# Patient Record
Sex: MALE | Race: WHITE | NOT HISPANIC OR LATINO | Employment: UNEMPLOYED | URBAN - METROPOLITAN AREA
[De-identification: names, ages, dates, MRNs, and addresses within clinical notes are randomized per-mention and may not be internally consistent; named-entity substitution may affect disease eponyms.]

---

## 2022-12-31 ENCOUNTER — APPOINTMENT (EMERGENCY)
Dept: CT IMAGING | Facility: HOSPITAL | Age: 57
End: 2022-12-31

## 2022-12-31 ENCOUNTER — HOSPITAL ENCOUNTER (EMERGENCY)
Facility: HOSPITAL | Age: 57
Discharge: HOME | End: 2022-12-31
Attending: EMERGENCY MEDICINE

## 2022-12-31 VITALS
SYSTOLIC BLOOD PRESSURE: 96 MMHG | RESPIRATION RATE: 16 BRPM | BODY MASS INDEX: 21.66 KG/M2 | WEIGHT: 138 LBS | HEIGHT: 67 IN | DIASTOLIC BLOOD PRESSURE: 62 MMHG | HEART RATE: 54 BPM | OXYGEN SATURATION: 95 % | TEMPERATURE: 98.9 F

## 2022-12-31 DIAGNOSIS — S39.011A STRAIN OF ABDOMINAL WALL, INITIAL ENCOUNTER: ICD-10-CM

## 2022-12-31 DIAGNOSIS — R10.9 ABDOMINAL WALL PAIN: Primary | ICD-10-CM

## 2022-12-31 LAB
ALBUMIN SERPL BCP-MCNC: 3.9 G/DL (ref 3.5–5)
ALP SERPL-CCNC: 88 U/L (ref 46–116)
ALT SERPL W P-5'-P-CCNC: 32 U/L (ref 12–78)
ANION GAP SERPL CALCULATED.3IONS-SCNC: 9 MMOL/L (ref 4–13)
AST SERPL W P-5'-P-CCNC: 29 U/L (ref 5–45)
BASOPHILS # BLD AUTO: 0.04 THOUSANDS/ÂΜL (ref 0–0.1)
BASOPHILS NFR BLD AUTO: 1 % (ref 0–1)
BILIRUB SERPL-MCNC: 0.2 MG/DL (ref 0.2–1)
BUN SERPL-MCNC: 14 MG/DL (ref 5–25)
CALCIUM SERPL-MCNC: 8.6 MG/DL (ref 8.3–10.1)
CHLORIDE SERPL-SCNC: 105 MMOL/L (ref 96–108)
CO2 SERPL-SCNC: 25 MMOL/L (ref 21–32)
CREAT SERPL-MCNC: 0.78 MG/DL (ref 0.6–1.3)
EOSINOPHIL # BLD AUTO: 0.15 THOUSAND/ÂΜL (ref 0–0.61)
EOSINOPHIL NFR BLD AUTO: 3 % (ref 0–6)
ERYTHROCYTE [DISTWIDTH] IN BLOOD BY AUTOMATED COUNT: 14.9 % (ref 11.6–15.1)
GFR SERPL CREATININE-BSD FRML MDRD: 100 ML/MIN/1.73SQ M
GLUCOSE SERPL-MCNC: 121 MG/DL (ref 65–140)
HCT VFR BLD AUTO: 39 % (ref 36.5–49.3)
HGB BLD-MCNC: 13 G/DL (ref 12–17)
IMM GRANULOCYTES # BLD AUTO: 0.02 THOUSAND/UL (ref 0–0.2)
IMM GRANULOCYTES NFR BLD AUTO: 0 % (ref 0–2)
LIPASE SERPL-CCNC: 115 U/L (ref 73–393)
LYMPHOCYTES # BLD AUTO: 1.96 THOUSANDS/ÂΜL (ref 0.6–4.47)
LYMPHOCYTES NFR BLD AUTO: 39 % (ref 14–44)
MCH RBC QN AUTO: 35 PG (ref 26.8–34.3)
MCHC RBC AUTO-ENTMCNC: 33.3 G/DL (ref 31.4–37.4)
MCV RBC AUTO: 105 FL (ref 82–98)
MONOCYTES # BLD AUTO: 0.71 THOUSAND/ÂΜL (ref 0.17–1.22)
MONOCYTES NFR BLD AUTO: 14 % (ref 4–12)
NEUTROPHILS # BLD AUTO: 2.16 THOUSANDS/ÂΜL (ref 1.85–7.62)
NEUTS SEG NFR BLD AUTO: 43 % (ref 43–75)
NRBC BLD AUTO-RTO: 0 /100 WBCS
PLATELET # BLD AUTO: 325 THOUSANDS/UL (ref 149–390)
PMV BLD AUTO: 10.4 FL (ref 8.9–12.7)
POTASSIUM SERPL-SCNC: 4.3 MMOL/L (ref 3.5–5.3)
PROT SERPL-MCNC: 7.2 G/DL (ref 6.4–8.4)
RBC # BLD AUTO: 3.71 MILLION/UL (ref 3.88–5.62)
SODIUM SERPL-SCNC: 139 MMOL/L (ref 135–147)
WBC # BLD AUTO: 5.04 THOUSAND/UL (ref 4.31–10.16)

## 2022-12-31 RX ORDER — KETOROLAC TROMETHAMINE 10 MG/1
10 TABLET, FILM COATED ORAL EVERY 8 HOURS
Qty: 9 TABLET | Refills: 0 | Status: SHIPPED | OUTPATIENT
Start: 2022-12-31 | End: 2023-01-03

## 2022-12-31 RX ORDER — CYCLOBENZAPRINE HCL 10 MG
10 TABLET ORAL 2 TIMES DAILY PRN
Qty: 20 TABLET | Refills: 0 | Status: SHIPPED | OUTPATIENT
Start: 2022-12-31

## 2022-12-31 RX ADMIN — IOHEXOL 100 ML: 350 INJECTION, SOLUTION INTRAVENOUS at 14:58

## 2022-12-31 NOTE — DISCHARGE INSTRUCTIONS
Abdominal wall strain  Do not operate any machinery or drive equipment or vehicles on cyclobenzaprine  Discussed return emergency department for any newly developing or worsening signs or symptoms  Patient understood all instructions prior to discharge and plan agreed upon by patient and myself

## 2023-01-03 NOTE — ED PROVIDER NOTES
History  Chief Complaint   Patient presents with   • Abdominal Pain     Left abdominal pain that started yesterday  Denies N/V/D  Reports difficulty urinating  Did not take anything for pain  64 y/o M w/ PMHX B/L inguinal hernias w/ herniorrhaphies, HTN/HLD, MDAD, current smoker presents w/ L sided abdominal pain and concerned he may have "popped a hernia" on the L side of his lower abdominal wall  He states the pain started yesterday after having several bouts of coughing and he is concerned as this feels like how his inguinal hernias presented  No N/V/D  No abdominal swelling or redness  No fevers  Pain described as sharp and stabbing as well as sore and predominantly only with movement or taking a deep breath  Not moving or absence of coughing relieves  None       Past Medical History:   Diagnosis Date   • Anxiety    • Depression    • HTN (hypertension)        History reviewed  No pertinent surgical history  History reviewed  No pertinent family history  I have reviewed and agree with the history as documented  E-Cigarette/Vaping     E-Cigarette/Vaping Substances     Social History     Tobacco Use   • Smoking status: Every Day     Packs/day: 1 50     Types: Cigarettes   • Smokeless tobacco: Never   Substance Use Topics   • Alcohol use: Yes   • Drug use: Never       Review of Systems   Constitutional: Negative for chills and fever  HENT: Negative for ear pain and sore throat  Eyes: Negative for pain and visual disturbance  Respiratory: Negative for cough and shortness of breath  Cardiovascular: Negative for chest pain and palpitations  Gastrointestinal: Positive for abdominal pain (L sided abdominal wall pain in the L lower lateral quadrant)  Negative for vomiting  Genitourinary: Negative for dysuria and hematuria  Musculoskeletal: Negative for arthralgias and back pain  Skin: Negative for color change and rash  Neurological: Negative for seizures and syncope     All other systems reviewed and are negative  Physical Exam  Physical Exam  Vitals and nursing note reviewed  Constitutional:       General: He is not in acute distress  Appearance: He is well-developed  HENT:      Head: Normocephalic and atraumatic  Eyes:      Conjunctiva/sclera: Conjunctivae normal    Cardiovascular:      Rate and Rhythm: Normal rate and regular rhythm  Heart sounds: No murmur heard  Pulmonary:      Effort: Pulmonary effort is normal  No respiratory distress  Breath sounds: Normal breath sounds  Abdominal:      Palpations: Abdomen is soft  Tenderness: There is no abdominal tenderness  Musculoskeletal:         General: No swelling  Cervical back: Neck supple  Skin:     General: Skin is warm and dry  Capillary Refill: Capillary refill takes less than 2 seconds  Neurological:      Mental Status: He is alert     Psychiatric:         Mood and Affect: Mood normal          Vital Signs  ED Triage Vitals [12/31/22 1346]   Temperature Pulse Respirations Blood Pressure SpO2   98 9 °F (37 2 °C) 55 20 141/71 99 %      Temp Source Heart Rate Source Patient Position - Orthostatic VS BP Location FiO2 (%)   Oral Monitor Sitting Left arm --      Pain Score       5           Vitals:    12/31/22 1346 12/31/22 1400 12/31/22 1430   BP: 141/71 109/66 96/62   Pulse: 55 (!) 53 (!) 54   Patient Position - Orthostatic VS: Sitting  Sitting         Visual Acuity      ED Medications  Medications   iohexol (OMNIPAQUE) 350 MG/ML injection (SINGLE-DOSE) 100 mL (100 mL Intravenous Given 12/31/22 1458)       Diagnostic Studies  Results Reviewed     Procedure Component Value Units Date/Time    CMP [[id number]] Collected: 12/31/22 1417    Lab Status: Final result Specimen: Blood from Arm, Left Updated: 12/31/22 1442     Sodium 139 mmol/L      Potassium 4 3 mmol/L      Chloride 105 mmol/L      CO2 25 mmol/L      ANION GAP 9 mmol/L      BUN 14 mg/dL      Creatinine 0 78 mg/dL      Glucose 121 mg/dL      Calcium 8 6 mg/dL      AST 29 U/L      ALT 32 U/L      Alkaline Phosphatase 88 U/L      Total Protein 7 2 g/dL      Albumin 3 9 g/dL      Total Bilirubin 0 20 mg/dL      eGFR 100 ml/min/1 73sq m     Narrative:      [employer] guidelines for Chronic Kidney Disease (CKD):   •  Stage 1 with normal or high GFR (GFR > 90 mL/min/1 73 square meters)  •  Stage 2 Mild CKD (GFR = 60-89 mL/min/1 73 square meters)  •  Stage 3A Moderate CKD (GFR = 45-59 mL/min/1 73 square meters)  •  Stage 3B Moderate CKD (GFR = 30-44 mL/min/1 73 square meters)  •  Stage 4 Severe CKD (GFR = 15-29 mL/min/1 73 square meters)  •  Stage 5 End Stage CKD (GFR <15 mL/min/1 73 square meters)  Note: GFR calculation is accurate only with a steady state creatinine    Lipase [[id number]]  (Normal) Collected: 12/31/22 1417    Lab Status: Final result Specimen: Blood from Arm, Left Updated: 12/31/22 1442     Lipase 115 u/L     CBC and differential [[id number]]  (Abnormal) Collected: 12/31/22 1417    Lab Status: Final result Specimen: Blood from Arm, Left Updated: 12/31/22 1423     WBC 5 04 Thousand/uL      RBC 3 71 Million/uL      Hemoglobin 13 0 g/dL      Hematocrit 39 0 %       fL      MCH 35 0 pg      MCHC 33 3 g/dL      RDW 14 9 %      MPV 10 4 fL      Platelets 843 Thousands/uL      nRBC 0 /100 WBCs      Neutrophils Relative 43 %      Immat GRANS % 0 %      Lymphocytes Relative 39 %      Monocytes Relative 14 %      Eosinophils Relative 3 %      Basophils Relative 1 %      Neutrophils Absolute 2 16 Thousands/µL      Immature Grans Absolute 0 02 Thousand/uL      Lymphocytes Absolute 1 96 Thousands/µL      Monocytes Absolute 0 71 Thousand/µL      Eosinophils Absolute 0 15 Thousand/µL      Basophils Absolute 0 04 Thousands/µL                  CT Abdomen pelvis with contrast   Final Result by Estell Frankel, MD (12/31 1533)      No acute pathology identified in the abdomen and pelvis              Workstation performed: [id number]                    Procedures  Procedures         ED Course           Discussed normal results and normal CT imaging  Ruled out occult hernia w/ CT imaging  Discussed probable abdominal wall strain in consideration of other rule outs  RTED instrux provided for any new or worsening s/s  Medical Decision Making  Abdominal wall pain: undiagnosed new problem with uncertain prognosis     Details: Possible strain of L muscle wall  No clinical indicators of incarcerated hernia  CT ruled out minor abdominal wall hernia or epiploic appendagitis  Strain of abdominal wall, initial encounter: self-limited or minor problem     Details: Presumptive  Discussed RTED if new or any worsening s/s  Amount and/or Complexity of Data Reviewed  Labs: ordered  Decision-making details documented in ED Course  Radiology: ordered  Decision-making details documented in ED Course  ECG/medicine tests: ordered  Decision-making details documented in ED Course  Risk  Prescription drug management  Disposition  Final diagnoses:   Abdominal wall pain   Strain of abdominal wall, initial encounter     Time reflects when diagnosis was documented in both MDM as applicable and the Disposition within this note     Time User Action Codes Description Comment    12/31/2022  4:30 PM Karen Garces [R10 [id number]] Abdominal wall pain     12/31/2022  4:30 PM Karen Garces [[id number]] Strain of abdominal wall, initial encounter       ED Disposition     ED Disposition   Discharge    Condition   Stable    Date/Time   Sat Dec 31, 2022  4:30 PM    Comment   Louise Candelario discharge to home/self care                 Follow-up Information     Follow up With Specialties Details Why Contact Info    KELSEY Cartwright Nurse Practitioner Call   [address]  [phone]            Discharge Medication List as of 12/31/2022  4:42 PM      START taking these medications Details   cyclobenzaprine (FLEXERIL) 10 mg tablet Take 1 tablet (10 mg total) by mouth 2 (two) times a day as needed for muscle spasms, Starting Sat 12/31/2022, Normal      ketorolac (TORADOL) 10 mg tablet Take 1 tablet (10 mg total) by mouth every 8 (eight) hours for 3 days, Starting Sat 12/31/2022, Until Tue 1/3/2023, Normal             No discharge procedures on file      PDMP Review     None          ED Provider  Electronically Signed by           Barbara To PA-C  01/03/23 0009